# Patient Record
Sex: MALE | Race: WHITE | Employment: UNEMPLOYED | URBAN - METROPOLITAN AREA
[De-identification: names, ages, dates, MRNs, and addresses within clinical notes are randomized per-mention and may not be internally consistent; named-entity substitution may affect disease eponyms.]

---

## 2021-08-18 ENCOUNTER — APPOINTMENT (OUTPATIENT)
Dept: GENERAL RADIOLOGY | Age: 41
End: 2021-08-18
Payer: COMMERCIAL

## 2021-08-18 ENCOUNTER — APPOINTMENT (OUTPATIENT)
Dept: ULTRASOUND IMAGING | Age: 41
End: 2021-08-18
Payer: COMMERCIAL

## 2021-08-18 ENCOUNTER — HOSPITAL ENCOUNTER (EMERGENCY)
Age: 41
Discharge: HOME | End: 2021-08-18
Payer: COMMERCIAL

## 2021-08-18 VITALS
SYSTOLIC BLOOD PRESSURE: 153 MMHG | HEART RATE: 74 BPM | RESPIRATION RATE: 18 BRPM | OXYGEN SATURATION: 96 % | DIASTOLIC BLOOD PRESSURE: 96 MMHG | WEIGHT: 310 LBS | TEMPERATURE: 98.5 F | HEIGHT: 73 IN | BODY MASS INDEX: 41.08 KG/M2

## 2021-08-18 DIAGNOSIS — M79.604 RIGHT LEG PAIN: Primary | ICD-10-CM

## 2021-08-18 PROCEDURE — 72100 X-RAY EXAM L-S SPINE 2/3 VWS: CPT

## 2021-08-18 PROCEDURE — 93971 EXTREMITY STUDY: CPT

## 2021-08-18 PROCEDURE — 99283 EMERGENCY DEPT VISIT LOW MDM: CPT

## 2021-08-18 RX ORDER — METHOCARBAMOL 500 MG/1
500 TABLET, FILM COATED ORAL 4 TIMES DAILY
COMMUNITY

## 2021-08-18 RX ORDER — NAPROXEN 500 MG/1
500 TABLET ORAL 2 TIMES DAILY WITH MEALS
Qty: 20 TABLET | Refills: 0 | Status: SHIPPED | OUTPATIENT
Start: 2021-08-18

## 2021-08-18 ASSESSMENT — PAIN DESCRIPTION - PAIN TYPE: TYPE: ACUTE PAIN

## 2021-08-18 ASSESSMENT — PAIN SCALES - GENERAL: PAINLEVEL_OUTOF10: 7

## 2021-08-18 ASSESSMENT — PAIN DESCRIPTION - ORIENTATION: ORIENTATION: RIGHT;UPPER;LOWER

## 2021-08-18 ASSESSMENT — PAIN DESCRIPTION - LOCATION: LOCATION: LEG

## 2021-08-18 ASSESSMENT — PAIN DESCRIPTION - DESCRIPTORS: DESCRIPTORS: BURNING;CRAMPING

## 2021-08-18 NOTE — ED TRIAGE NOTES
FIRST PROVIDER CONTACT ASSESSMENT NOTE      Department of Emergency Medicine   Admit Date: No admission date for patient encounter. Chief Complaint: Leg Pain (right leg pain)      History of Present Illness:   Omar Rodriguez is a 35 y.o. male who presents to the ED for right leg pain-in the hamstring area as well as the calf.  Back pain 3 weeks ago.         -----------------END OF FIRST PROVIDER CONTACT ASSESSMENT NOTE--------------  Electronically signed by BUTCH Gould   DD: 8/18/21

## 2021-08-18 NOTE — ED PROVIDER NOTES
[facility]    HPI:  8/18/21, Time: 11:48 AM EDT         Leonidas Uribe is a 35 y.o. male presenting to the ED for right leg pain, beginning several days ago. The complaint has been persistent, moderate in severity, and worsened by nothing. Patient states that the pain is located in the right upper leg and right calf. Denies injury. Did have back pain 3 weeks ago but none currently. Denies bowel/bladder incontinence or saddle paresthesias. Denies numbness and tingling in the lower extremities. Afebrile without recent travel or sick contacts. Patient denies all other symptoms at this time. Review of Systems:   A complete review of systems was performed and pertinent positives and negatives are stated within HPI, all other systems reviewed and are negative.          --------------------------------------------- PAST HISTORY ---------------------------------------------  Past Medical History:  has no past medical history on file. Past Surgical History:  has no past surgical history on file. Social History:  reports that he has never smoked. His smokeless tobacco use includes chew. He reports current alcohol use. He reports previous drug use. Family History: family history is not on file. The patients home medications have been reviewed. Allergies: Patient has no known allergies. -------------------------------------------------- RESULTS -------------------------------------------------  All laboratory and radiology results have been personally reviewed by myself   LABS:  No results found for this visit on 08/18/21. RADIOLOGY:  Interpreted by Radiologist.  US DUP LOWER EXTREMITY RIGHT CHRISSY   Final Result   No evidence of DVT in the right lower extremity. XR LUMBAR SPINE (2-3 VIEWS)   Final Result   Minimal anterior wedging at T12 is of indeterminate acuity and may be   longstanding. Degenerative spondylotic changes.              ------------------------- NURSING NOTES AND VITALS REVIEWED ---------------------------   The nursing notes within the ED encounter and vital signs as below have been reviewed. BP (!) 153/96   Pulse 74   Temp 98.5 °F (36.9 °C)   Resp 18   Ht 6' 1\" (1.854 m)   Wt (!) 310 lb (140.6 kg)   SpO2 96%   BMI 40.90 kg/m²   Oxygen Saturation Interpretation: Normal      ---------------------------------------------------PHYSICAL EXAM--------------------------------------      Constitutional/General: Alert and oriented x3, well appearing, non toxic in NAD  Head: Normocephalic and atraumatic  Eyes: PERRL, EOMI  Mouth: Oropharynx clear, handling secretions, no trismus  Neck: Supple, full ROM,   Pulmonary: Lungs clear to auscultation bilaterally, no wheezes, rales, or rhonchi. Not in respiratory distress  Cardiovascular:  Regular rate and rhythm, no murmurs, gallops, or rubs. 2+ distal pulses  Abdomen: Soft, non tender, non distended,   Extremities: Moves all extremities x 4. Warm and well perfused  Skin: warm and dry without rash  Neurologic: GCS 15,  Psych: Normal Affect      ------------------------------ ED COURSE/MEDICAL DECISION MAKING----------------------  Medications - No data to display      ED COURSE:  ED Course as of Aug 18 2222   Wed Aug 18, 2021   1343 Reassessed patient. Remains stable. Discussed results. [MS]      ED Course User Index  [MS] [facility] [place]       Medical Decision Making:    Patient is a 35year old male presenting to the ED with right leg pain. No injury. He was concerned about a blood clot. Also had back pain. No s/sx of cauda equina. Imaging studies do not reveal acute pathology. Advised to follow up with PCP for recheck and return to the ED with new or worsening symptoms. Patient voiced understanding and is agreeable to the above treatment plan. Counseling:    The emergency provider has spoken with the patient and discussed todays results, in addition to providing specific details for the plan of care and counseling regarding the diagnosis and prognosis. Questions are answered at this time and they are agreeable with the plan.      --------------------------------- IMPRESSION AND DISPOSITION ---------------------------------    IMPRESSION  1. Right leg pain        DISPOSITION  Disposition: Discharge to home  Patient condition is stable      NOTE: This report was transcribed using voice recognition software.  Every effort was made to ensure accuracy; however, inadvertent computerized transcription errors may be present        [place], [place]  08/18/21 [id number]